# Patient Record
Sex: MALE | Race: BLACK OR AFRICAN AMERICAN | NOT HISPANIC OR LATINO
[De-identification: names, ages, dates, MRNs, and addresses within clinical notes are randomized per-mention and may not be internally consistent; named-entity substitution may affect disease eponyms.]

---

## 2022-01-06 ENCOUNTER — APPOINTMENT (OUTPATIENT)
Dept: GASTROENTEROLOGY | Facility: CLINIC | Age: 62
End: 2022-01-06
Payer: COMMERCIAL

## 2022-01-06 VITALS
DIASTOLIC BLOOD PRESSURE: 92 MMHG | OXYGEN SATURATION: 99 % | HEART RATE: 68 BPM | BODY MASS INDEX: 26.77 KG/M2 | TEMPERATURE: 97.9 F | RESPIRATION RATE: 18 BRPM | HEIGHT: 70 IN | SYSTOLIC BLOOD PRESSURE: 151 MMHG | WEIGHT: 187 LBS

## 2022-01-06 DIAGNOSIS — Z12.11 ENCOUNTER FOR SCREENING FOR MALIGNANT NEOPLASM OF COLON: ICD-10-CM

## 2022-01-06 DIAGNOSIS — D49.519 NEOPLASM OF UNSPECIFIED BEHAVIOR OF UNSPECIFIED KIDNEY: ICD-10-CM

## 2022-01-06 DIAGNOSIS — Z82.49 FAMILY HISTORY OF ISCHEMIC HEART DISEASE AND OTHER DISEASES OF THE CIRCULATORY SYSTEM: ICD-10-CM

## 2022-01-06 DIAGNOSIS — I10 ESSENTIAL (PRIMARY) HYPERTENSION: ICD-10-CM

## 2022-01-06 DIAGNOSIS — Z78.9 OTHER SPECIFIED HEALTH STATUS: ICD-10-CM

## 2022-01-06 DIAGNOSIS — Z20.822 CONTACT WITH AND (SUSPECTED) EXPOSURE TO COVID-19: ICD-10-CM

## 2022-01-06 PROBLEM — Z00.00 ENCOUNTER FOR PREVENTIVE HEALTH EXAMINATION: Status: ACTIVE | Noted: 2022-01-06

## 2022-01-06 PROCEDURE — 99204 OFFICE O/P NEW MOD 45 MIN: CPT

## 2022-01-06 RX ORDER — AMLODIPINE BESYLATE 5 MG/1
5 TABLET ORAL
Refills: 0 | Status: ACTIVE | COMMUNITY

## 2022-01-06 NOTE — ASSESSMENT
[FreeTextEntry1] : Screening colonoscopy in this high risk patient with positive occult blood.  The importance of colon cancer screening and the colonoscopy prep was discussed with the patient.  He understands and all questions were answered.

## 2022-01-06 NOTE — HISTORY OF PRESENT ILLNESS
[Heartburn] : denies heartburn [Nausea] : denies nausea [Vomiting] : denies vomiting [Diarrhea] : denies diarrhea [Constipation] : denies constipation [Yellow Skin Or Eyes (Jaundice)] : denies jaundice [Abdominal Pain] : denies abdominal pain [Abdominal Swelling] : denies abdominal swelling [Rectal Pain] : denies rectal pain [Malignancy] : malignancy [Abdominal Surgery] : abdominal surgery [Wt Gain ___ Lbs] : no recent weight gain [Wt Loss ___ Lbs] : no recent weight loss [GERD] : no gastroesophageal reflux disease [Hiatus Hernia] : no hiatus hernia [Peptic Ulcer Disease] : no peptic ulcer disease [Pancreatitis] : no pancreatitis [Cholelithiasis] : no cholelithiasis [Kidney Stone] : no kidney stone [Inflammatory Bowel Disease] : no inflammatory bowel disease [Irritable Bowel Syndrome] : no irritable bowel syndrome [Diverticulitis] : no diverticulitis [Alcohol Abuse] : no alcohol abuse [Appendectomy] : no appendectomy [Cholecystectomy] : no cholecystectomy [de-identified] : 61-year-old man referred for screening colonoscopy.  His previous colonoscopy several years ago was unremarkable according to the patient however he is not certain when he had it done.  The result is not available.  He denies rectal bleeding, melena or hematemesis.  However recent fit test was positive.  He is in good health except for hypertension for which he takes amlodipine.  He is status post nephrectomy for a renal cell cancer and bowel resection for a desmoid tumor.

## 2022-01-12 ENCOUNTER — APPOINTMENT (OUTPATIENT)
Dept: GASTROENTEROLOGY | Facility: CLINIC | Age: 62
End: 2022-01-12

## 2022-01-13 LAB — SARS-COV-2 N GENE NPH QL NAA+PROBE: NOT DETECTED

## 2022-01-17 ENCOUNTER — APPOINTMENT (OUTPATIENT)
Dept: GASTROENTEROLOGY | Facility: AMBULATORY MEDICAL SERVICES | Age: 62
End: 2022-01-17
Payer: COMMERCIAL

## 2022-01-17 PROCEDURE — 45380 COLONOSCOPY AND BIOPSY: CPT

## 2022-02-03 ENCOUNTER — APPOINTMENT (OUTPATIENT)
Dept: GASTROENTEROLOGY | Facility: CLINIC | Age: 62
End: 2022-02-03
Payer: COMMERCIAL

## 2022-02-03 VITALS
SYSTOLIC BLOOD PRESSURE: 154 MMHG | OXYGEN SATURATION: 100 % | WEIGHT: 181 LBS | HEART RATE: 61 BPM | BODY MASS INDEX: 25.91 KG/M2 | HEIGHT: 70 IN | DIASTOLIC BLOOD PRESSURE: 92 MMHG | TEMPERATURE: 98 F

## 2022-02-03 DIAGNOSIS — Z98.890 OTHER SPECIFIED POSTPROCEDURAL STATES: ICD-10-CM

## 2022-02-03 DIAGNOSIS — R19.5 OTHER FECAL ABNORMALITIES: ICD-10-CM

## 2022-02-03 DIAGNOSIS — Z09 ENCOUNTER FOR FOLLOW-UP EXAMINATION AFTER COMPLETED TREATMENT FOR CONDITIONS OTHER THAN MALIGNANT NEOPLASM: ICD-10-CM

## 2022-02-03 PROCEDURE — 99214 OFFICE O/P EST MOD 30 MIN: CPT

## 2022-02-03 RX ORDER — SODIUM SULFATE, POTASSIUM SULFATE, MAGNESIUM SULFATE 17.5; 3.13; 1.6 G/ML; G/ML; G/ML
17.5-3.13-1.6 SOLUTION, CONCENTRATE ORAL
Qty: 1 | Refills: 0 | Status: DISCONTINUED | COMMUNITY
Start: 2022-01-06 | End: 2022-02-03

## 2022-02-04 PROBLEM — R19.5 FECAL OCCULT BLOOD TEST POSITIVE: Status: ACTIVE | Noted: 2022-02-04

## 2022-02-04 PROBLEM — Z98.890 STATUS POST COLONOSCOPY: Status: ACTIVE | Noted: 2022-02-04

## 2022-02-04 NOTE — PHYSICAL EXAM
[General Appearance - Alert] : alert [General Appearance - In No Acute Distress] : in no acute distress [Sclera] : the sclera and conjunctiva were normal [PERRL With Normal Accommodation] : pupils were equal in size, round, and reactive to light [Extraocular Movements] : extraocular movements were intact [Outer Ear] : the ears and nose were normal in appearance [Oropharynx] : the oropharynx was normal [Neck Appearance] : the appearance of the neck was normal [Neck Cervical Mass (___cm)] : no neck mass was observed [Jugular Venous Distention Increased] : there was no jugular-venous distention [Thyroid Diffuse Enlargement] : the thyroid was not enlarged [Thyroid Nodule] : there were no palpable thyroid nodules [] : no respiratory distress [Auscultation Breath Sounds / Voice Sounds] : lungs were clear to auscultation bilaterally [Heart Rate And Rhythm] : heart rate was normal and rhythm regular [Heart Sounds] : normal S1 and S2 [Heart Sounds Gallop] : no gallops [Murmurs] : no murmurs [Heart Sounds Pericardial Friction Rub] : no pericardial rub [Flat] : flat [Soft, Nontender] : the abdomen was soft and nontender [Normal] : normal to percussion [Cervical Lymph Nodes Enlarged Posterior Bilaterally] : posterior cervical [Cervical Lymph Nodes Enlarged Anterior Bilaterally] : anterior cervical [Supraclavicular Lymph Nodes Enlarged Bilaterally] : supraclavicular [Axillary Lymph Nodes Enlarged Bilaterally] : axillary [Femoral Lymph Nodes Enlarged Bilaterally] : femoral [Inguinal Lymph Nodes Enlarged Bilaterally] : inguinal [No CVA Tenderness] : no ~M costovertebral angle tenderness [No Spinal Tenderness] : no spinal tenderness [Abnormal Walk] : normal gait [Nail Clubbing] : no clubbing  or cyanosis of the fingernails [Musculoskeletal - Swelling] : no joint swelling seen [Motor Tone] : muscle strength and tone were normal [Deep Tendon Reflexes (DTR)] : deep tendon reflexes were 2+ and symmetric [Sensation] : the sensory exam was normal to light touch and pinprick [No Focal Deficits] : no focal deficits [Oriented To Time, Place, And Person] : oriented to person, place, and time [Impaired Insight] : insight and judgment were intact [Affect] : the affect was normal [Firm] : not firm [Rigid] : not rigid [Rebound] : no rebound [Guarding] : no guarding [Mccoy's] : a negative Mccoy's sign

## 2022-02-04 NOTE — HISTORY OF PRESENT ILLNESS
[de-identified] : 61-year-old man presents for a follow up post  screening colonoscopy. Colonoscopy preformed on 1/17/22 revealed nodularity, erythema, and friability, pseudopolyps in the distal ascending colon anastomosis compatible with anastomosis hypertrophy.  He denies rectal bleeding, melena or hematemesis. However recent fit test was positive. He is in good health except for hypertension for which he takes amlodipine. He is status post nephrectomy for a renal cell cancer and bowel resection for a desmoid tumor. \par Bx results revealed colonic mucosa with reactive changes and granulation tissue negative for dysplasia or malignancy. Patient daughter states father most recent blood work remains low and he is still anemic. A copy of blood work last drawn in December will be obtain from his PCP Janet. Patient needs a capsule endoscopy as per recommendations from Dr Liao. Patient states bowel movements are daily and regular without  difficultly or strain. \par

## 2022-02-04 NOTE — ASSESSMENT
[FreeTextEntry1] : Attending Attestation \par \par Patient will have a capsule endoscopy. The risks benefits alternatives and complications of the procedure/s were explained to the patient at\par length. The patient was agreeable and we will proceed. \par \par PAtient may need to f/u with surgeon who preformed resection if capsule endoscopy is negative as per MD Liao. \par \par Time spent with patient 30 min \par \par \par Patient verbalized understanding of all information provided. All questions answered and reviewed. \par \par

## 2022-02-14 ENCOUNTER — APPOINTMENT (OUTPATIENT)
Dept: GASTROENTEROLOGY | Facility: CLINIC | Age: 62
End: 2022-02-14

## 2022-03-14 ENCOUNTER — APPOINTMENT (OUTPATIENT)
Dept: GASTROENTEROLOGY | Facility: CLINIC | Age: 62
End: 2022-03-14
Payer: COMMERCIAL

## 2022-03-14 PROCEDURE — 91110 GI TRC IMG INTRAL ESOPH-ILE: CPT

## 2022-03-28 ENCOUNTER — APPOINTMENT (OUTPATIENT)
Dept: GASTROENTEROLOGY | Facility: CLINIC | Age: 62
End: 2022-03-28
Payer: COMMERCIAL

## 2022-03-28 VITALS
HEIGHT: 70 IN | OXYGEN SATURATION: 100 % | HEART RATE: 67 BPM | SYSTOLIC BLOOD PRESSURE: 140 MMHG | WEIGHT: 180 LBS | BODY MASS INDEX: 25.77 KG/M2 | DIASTOLIC BLOOD PRESSURE: 95 MMHG

## 2022-03-28 VITALS — TEMPERATURE: 98 F

## 2022-03-28 DIAGNOSIS — K92.1 MELENA: ICD-10-CM

## 2022-03-28 DIAGNOSIS — Z86.03 PERSONAL HISTORY OF NEOPLASM OF UNCERTAIN BEHAVIOR: ICD-10-CM

## 2022-03-28 PROCEDURE — 99214 OFFICE O/P EST MOD 30 MIN: CPT

## 2022-03-28 NOTE — ASSESSMENT
[FreeTextEntry1] : Occult blood in the stool.  This may be secondary to inflammation and nodularity at the colonic anastomotic site.  There is no evidence of active GI bleeding.  Patient is to follow-up with his surgeon at [facility].  This was all discussed with the patient and his wife.  They understand and all questions were answered.

## 2022-03-28 NOTE — HISTORY OF PRESENT ILLNESS
[de-identified] : 61-year-old man with anemia.  According to the patient his hemoglobin hematocrit has been stable.  He has no evidence of rectal bleeding.  Recent capsule endoscopy showed no evidence of bleeding.  Colonoscopy there was nodularity at the anastomosis but the biopsies were negative.

## 2022-04-07 ENCOUNTER — NON-APPOINTMENT (OUTPATIENT)
Age: 62
End: 2022-04-07

## 2025-07-07 ENCOUNTER — APPOINTMENT (OUTPATIENT)
Dept: GASTROENTEROLOGY | Facility: CLINIC | Age: 65
End: 2025-07-07
Payer: COMMERCIAL

## 2025-07-07 ENCOUNTER — NON-APPOINTMENT (OUTPATIENT)
Age: 65
End: 2025-07-07

## 2025-07-07 VITALS
SYSTOLIC BLOOD PRESSURE: 158 MMHG | WEIGHT: 168 LBS | RESPIRATION RATE: 18 BRPM | HEIGHT: 70 IN | HEART RATE: 57 BPM | TEMPERATURE: 98.1 F | OXYGEN SATURATION: 98 % | DIASTOLIC BLOOD PRESSURE: 91 MMHG | BODY MASS INDEX: 24.05 KG/M2

## 2025-07-07 PROBLEM — Z86.03: Status: RESOLVED | Noted: 2022-01-06 | Resolved: 2025-07-07

## 2025-07-07 PROBLEM — Z12.11 ENCOUNTER FOR SCREENING COLONOSCOPY: Status: ACTIVE | Noted: 2022-01-06

## 2025-07-07 PROCEDURE — 99204 OFFICE O/P NEW MOD 45 MIN: CPT

## 2025-07-07 RX ORDER — SODIUM SULFATE, POTASSIUM SULFATE AND MAGNESIUM SULFATE 1.6; 3.13; 17.5 G/177ML; G/177ML; G/177ML
17.5-3.13-1.6 SOLUTION ORAL
Qty: 1 | Refills: 0 | Status: ACTIVE | COMMUNITY
Start: 2025-07-07 | End: 1900-01-01

## 2025-08-27 ENCOUNTER — APPOINTMENT (OUTPATIENT)
Dept: GASTROENTEROLOGY | Facility: AMBULATORY MEDICAL SERVICES | Age: 65
End: 2025-08-27
Payer: COMMERCIAL

## 2025-08-27 PROCEDURE — 45378 DIAGNOSTIC COLONOSCOPY: CPT | Mod: PT,53
